# Patient Record
Sex: MALE | Race: WHITE | Employment: FULL TIME | URBAN - METROPOLITAN AREA
[De-identification: names, ages, dates, MRNs, and addresses within clinical notes are randomized per-mention and may not be internally consistent; named-entity substitution may affect disease eponyms.]

---
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
## 2022-08-04 ENCOUNTER — APPOINTMENT (OUTPATIENT)
Dept: GENERAL RADIOLOGY | Age: 22
End: 2022-08-04
Payer: COMMERCIAL

## 2022-08-04 ENCOUNTER — HOSPITAL ENCOUNTER (EMERGENCY)
Age: 22
Discharge: HOME | End: 2022-08-04
Payer: COMMERCIAL

## 2022-08-04 VITALS
TEMPERATURE: 98.5 F | OXYGEN SATURATION: 98 % | SYSTOLIC BLOOD PRESSURE: 156 MMHG | HEART RATE: 85 BPM | BODY MASS INDEX: 39.17 KG/M2 | RESPIRATION RATE: 18 BRPM | WEIGHT: 315 LBS | HEIGHT: 75 IN | DIASTOLIC BLOOD PRESSURE: 85 MMHG

## 2022-08-04 DIAGNOSIS — M25.471 ANKLE SWELLING, RIGHT: Primary | ICD-10-CM

## 2022-08-04 LAB — URIC ACID, SERUM: 9.9 MG/DL (ref 3.4–7)

## 2022-08-04 PROCEDURE — 36415 COLL VENOUS BLD VENIPUNCTURE: CPT

## 2022-08-04 PROCEDURE — 99211 OFF/OP EST MAY X REQ PHY/QHP: CPT

## 2022-08-04 PROCEDURE — 84550 ASSAY OF BLOOD/URIC ACID: CPT

## 2022-08-04 PROCEDURE — 6360000002 HC RX W HCPCS: Performed by: PHYSICIAN ASSISTANT

## 2022-08-04 PROCEDURE — 73610 X-RAY EXAM OF ANKLE: CPT

## 2022-08-04 RX ORDER — COLCHICINE 0.6 MG/1
TABLET ORAL
Qty: 12 TABLET | Refills: 0 | Status: SHIPPED | OUTPATIENT
Start: 2022-08-04

## 2022-08-04 RX ORDER — DEXAMETHASONE SODIUM PHOSPHATE 10 MG/ML
10 INJECTION, SOLUTION INTRAMUSCULAR; INTRAVENOUS ONCE
Status: COMPLETED | OUTPATIENT
Start: 2022-08-04 | End: 2022-08-04

## 2022-08-04 RX ADMIN — DEXAMETHASONE SODIUM PHOSPHATE 10 MG: 10 INJECTION, SOLUTION INTRAMUSCULAR; INTRAVENOUS at 15:36

## 2022-08-04 ASSESSMENT — PAIN - FUNCTIONAL ASSESSMENT
PAIN_FUNCTIONAL_ASSESSMENT: 0-10
PAIN_FUNCTIONAL_ASSESSMENT: 0-10

## 2022-08-04 ASSESSMENT — PAIN SCALES - GENERAL
PAINLEVEL_OUTOF10: 7
PAINLEVEL_OUTOF10: 10

## 2022-08-04 ASSESSMENT — PAIN DESCRIPTION - ORIENTATION: ORIENTATION: RIGHT

## 2022-08-04 NOTE — ED PROVIDER NOTES
[id number]  Department of Emergency Medicine  UC Encounter Note  22   1:51 PM EDT      NAME: Gagandeep Son  :  2000  MRN:  [id number]    Chief Complaint: Ankle Pain (Stared yesterday started hurting no injury noted  slightly swollen no redness noted   cant walk on it)      This is a 80-year-old male the presents to urgent care complaining of right ankle pain swellin for the past several days. He denies any injury, he states this happened before this year but it only happened for a brief period of time this seems like it is more pronounced. He denies any fevers or chills. On first contact patient he appears to be in no acute distress. Review of Systems  Pertinent positives and negatives are stated within HPI, all other systems reviewed and are negative. Physical Exam  Vitals and nursing note reviewed. Constitutional:       Appearance: He is well-developed. HENT:      Head: Normocephalic and atraumatic. Jaw: No trismus. Right Ear: Hearing and external ear normal.      Left Ear: Hearing and external ear normal.      Nose: Nose normal.      Right Sinus: No maxillary sinus tenderness or frontal sinus tenderness. Left Sinus: No maxillary sinus tenderness or frontal sinus tenderness. Mouth/Throat:      Pharynx: Uvula midline. No uvula swelling. Eyes:      General: Lids are normal.      Conjunctiva/sclera: Conjunctivae normal.      Pupils: Pupils are equal, round, and reactive to light. Cardiovascular:      Rate and Rhythm: Normal rate and regular rhythm. Heart sounds: Normal heart sounds. No murmur heard. Pulmonary:      Effort: Pulmonary effort is normal.      Breath sounds: Normal breath sounds. Abdominal:      General: Bowel sounds are normal.      Palpations: Abdomen is soft. Abdomen is not rigid. Tenderness: There is no abdominal tenderness. There is no guarding or rebound.    Musculoskeletal:      Cervical back: Normal range of motion and neck supple. Comments: Right ankle area is tender swollen slightly erythematous. Has palpable pedal pulses. No red streaking. No open area no cyanosis. Skin:     General: Skin is warm and dry. Findings: No abrasion or rash. Neurological:      General: No focal deficit present. Mental Status: He is alert and oriented to person, place, and time. GCS: GCS eye subscore is 4. GCS verbal subscore is 5. GCS motor subscore is 6. Cranial Nerves: No cranial nerve deficit. Sensory: No sensory deficit. Coordination: Coordination normal.      Gait: Gait normal.       Procedures    MDM  Number of Diagnoses or Management Options  Ankle swelling, right  Diagnosis management comments: Patient is in no acute distress. X-ray was reviewed. Uric acid levels pending. Treat for possible gout. Told to follow-up closely with his primary care provider. --------------------------------------------- PAST HISTORY ---------------------------------------------  Past Medical History:  has a past medical history of Cellulitis of right leg. Past Surgical History:  has a past surgical history that includes [place] tooth extraction. Social History:  reports that he has never smoked. He has never used smokeless tobacco. He reports that he does not drink alcohol and does not use drugs. Family History: family history is not on file. The patients home medications have been reviewed. Allergies: Patient has no known allergies. -------------------------------------------------- RESULTS -------------------------------------------------  No results found for this visit on 08/04/22. XR ANKLE RIGHT (MIN 3 VIEWS)   Final Result   No acute process             ------------------------- NURSING NOTES AND VITALS REVIEWED ---------------------------   The nursing notes within the ED encounter and vital signs as below have been reviewed.    BP (!) 156/85   Pulse 85   Temp 98.5 °F (36.9 °C)   Resp 18   Ht 6' 3\" (1.905 m)   Wt (!) 375 lb (170.1 kg)   SpO2 98%   BMI 46.87 kg/m²   Oxygen Saturation Interpretation: Normal      ------------------------------------------ PROGRESS NOTES ------------------------------------------   I have spoken with the patient and discussed todays results, in addition to providing specific details for the plan of care and counseling regarding the diagnosis and prognosis. Their questions are answered at this time and they are agreeable with the plan.      --------------------------------- ADDITIONAL PROVIDER NOTES ---------------------------------     This patient is stable for discharge. I have shared the specific conditions for return, as well as the importance of follow-up. * NOTE: This report was transcribed using voice recognition software. Every effort was made to ensure accuracy; however, inadvertent computerized transcription errors may be present.    --------------------------------- IMPRESSION AND DISPOSITION ---------------------------------    IMPRESSION  1.  Ankle swelling, right        DISPOSITION  Disposition: Discharge to home  Patient condition is good       Mayra Mathew PA-C  08/04/22 [id number]

## 2022-08-04 NOTE — DISCHARGE INSTRUCTIONS
Check with your primary care for uric acid (gout) level  May continue ibuprofen for pain/inflammation (take with food)